# Patient Record
Sex: MALE | Race: WHITE
[De-identification: names, ages, dates, MRNs, and addresses within clinical notes are randomized per-mention and may not be internally consistent; named-entity substitution may affect disease eponyms.]

---

## 2019-08-24 ENCOUNTER — HOSPITAL ENCOUNTER (INPATIENT)
Dept: HOSPITAL 95 - ER | Age: 84
LOS: 5 days | Discharge: HOME HEALTH | DRG: 871 | End: 2019-08-29
Attending: HOSPITALIST | Admitting: HOSPITALIST
Payer: MEDICARE

## 2019-08-24 VITALS — HEIGHT: 67.01 IN | BODY MASS INDEX: 28.41 KG/M2 | WEIGHT: 181 LBS

## 2019-08-24 DIAGNOSIS — A41.9: Primary | ICD-10-CM

## 2019-08-24 DIAGNOSIS — K81.0: ICD-10-CM

## 2019-08-24 DIAGNOSIS — T36.0X5A: ICD-10-CM

## 2019-08-24 DIAGNOSIS — K52.1: ICD-10-CM

## 2019-08-24 DIAGNOSIS — N39.0: ICD-10-CM

## 2019-08-24 DIAGNOSIS — Z66: ICD-10-CM

## 2019-08-24 DIAGNOSIS — F02.81: ICD-10-CM

## 2019-08-24 DIAGNOSIS — H91.90: ICD-10-CM

## 2019-08-24 DIAGNOSIS — Z96.651: ICD-10-CM

## 2019-08-24 DIAGNOSIS — Z74.01: ICD-10-CM
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
## 2019-08-24 DIAGNOSIS — N17.9: ICD-10-CM

## 2019-08-24 DIAGNOSIS — G30.9: ICD-10-CM

## 2019-08-24 DIAGNOSIS — Z87.891: ICD-10-CM

## 2019-08-24 DIAGNOSIS — R65.20: ICD-10-CM

## 2019-08-24 DIAGNOSIS — G92: ICD-10-CM

## 2019-08-24 LAB
ALBUMIN SERPL BCP-MCNC: 2.6 G/DL (ref 3.4–5)
ALBUMIN/GLOB SERPL: 0.4 {RATIO} (ref 0.8–1.8)
ALT SERPL W P-5'-P-CCNC: 205 U/L (ref 12–78)
ANION GAP SERPL CALCULATED.4IONS-SCNC: 10 MMOL/L (ref 6–16)
AST SERPL W P-5'-P-CCNC: 104 U/L (ref 12–37)
BASOPHILS # BLD AUTO: 0.06 K/MM3 (ref 0–0.23)
BASOPHILS NFR BLD AUTO: 0 % (ref 0–2)
BILIRUB SERPL-MCNC: 1.8 MG/DL (ref 0.1–1)
BUN SERPL-MCNC: 36 MG/DL (ref 8–24)
CALCIUM SERPL-MCNC: 9.3 MG/DL (ref 8.5–10.1)
CHLORIDE SERPL-SCNC: 103 MMOL/L (ref 98–108)
CO2 SERPL-SCNC: 24 MMOL/L (ref 21–32)
CREAT SERPL-MCNC: 1.57 MG/DL (ref 0.6–1.2)
DEPRECATED RDW RBC AUTO: 49.8 FL (ref 35.1–46.3)
EOSINOPHIL # BLD AUTO: 0.01 K/MM3 (ref 0–0.68)
EOSINOPHIL NFR BLD AUTO: 0 % (ref 0–6)
ERYTHROCYTE [DISTWIDTH] IN BLOOD BY AUTOMATED COUNT: 16 % (ref 11.7–14.2)
GLOBULIN SER CALC-MCNC: 5.9 G/DL (ref 2.2–4)
GLUCOSE SERPL-MCNC: 185 MG/DL (ref 70–99)
HCT VFR BLD AUTO: 40.4 % (ref 37–53)
HGB BLD-MCNC: 12.9 G/DL (ref 13.5–17.5)
IMM GRANULOCYTES # BLD AUTO: 0.2 K/MM3 (ref 0–0.1)
IMM GRANULOCYTES NFR BLD AUTO: 1 % (ref 0–1)
KETONES UR STRIP-MCNC: (no result) MG/DL
LEUKOCYTE ESTERASE UR QL STRIP: (no result)
LYMPHOCYTES # BLD AUTO: 2.18 K/MM3 (ref 0.84–5.2)
LYMPHOCYTES NFR BLD AUTO: 11 % (ref 21–46)
MAGNESIUM SERPL-MCNC: 2.4 MG/DL (ref 1.6–2.4)
MCHC RBC AUTO-ENTMCNC: 31.9 G/DL (ref 31.5–36.5)
MCV RBC AUTO: 86 FL (ref 80–100)
MONOCYTES # BLD AUTO: 1.04 K/MM3 (ref 0.16–1.47)
MONOCYTES NFR BLD AUTO: 5 % (ref 4–13)
NEUTROPHILS # BLD AUTO: 16.35 K/MM3 (ref 1.96–9.15)
NEUTROPHILS NFR BLD AUTO: 82 % (ref 41–73)
NRBC # BLD AUTO: 0 K/MM3 (ref 0–0.02)
NRBC BLD AUTO-RTO: 0 /100 WBC (ref 0–0.2)
PLATELET # BLD AUTO: 459 K/MM3 (ref 150–400)
POTASSIUM SERPL-SCNC: 4.4 MMOL/L (ref 3.5–5.5)
PROT SERPL-MCNC: 8.5 G/DL (ref 6.4–8.2)
PROT UR STRIP-MCNC: (no result) MG/DL
RBC #/AREA URNS HPF: (no result) /HPF (ref 0–2)
SODIUM SERPL-SCNC: 137 MMOL/L (ref 136–145)
SP GR SPEC: 1.01 (ref 1–1.02)
TROPONIN I SERPL-MCNC: <0.015 NG/ML (ref 0–0.04)
UROBILINOGEN UR STRIP-MCNC: (no result) MG/DL
WBC #/AREA URNS HPF: (no result) /HPF (ref 0–5)

## 2019-08-25 LAB
ALBUMIN SERPL BCP-MCNC: 1.9 G/DL (ref 3.4–5)
ALBUMIN/GLOB SERPL: 0.4 {RATIO} (ref 0.8–1.8)
ALT SERPL W P-5'-P-CCNC: 123 U/L (ref 12–78)
ANION GAP SERPL CALCULATED.4IONS-SCNC: 7 MMOL/L (ref 6–16)
AST SERPL W P-5'-P-CCNC: 54 U/L (ref 12–37)
BASOPHILS # BLD AUTO: 0.03 K/MM3 (ref 0–0.23)
BASOPHILS NFR BLD AUTO: 0 % (ref 0–2)
BILIRUB SERPL-MCNC: 1.1 MG/DL (ref 0.1–1)
BUN SERPL-MCNC: 32 MG/DL (ref 8–24)
CALCIUM SERPL-MCNC: 7.9 MG/DL (ref 8.5–10.1)
CHLORIDE SERPL-SCNC: 112 MMOL/L (ref 98–108)
CO2 SERPL-SCNC: 24 MMOL/L (ref 21–32)
CREAT SERPL-MCNC: 1.26 MG/DL (ref 0.6–1.2)
DEPRECATED RDW RBC AUTO: 49.5 FL (ref 35.1–46.3)
EOSINOPHIL # BLD AUTO: 0.06 K/MM3 (ref 0–0.68)
EOSINOPHIL NFR BLD AUTO: 0 % (ref 0–6)
ERYTHROCYTE [DISTWIDTH] IN BLOOD BY AUTOMATED COUNT: 15.9 % (ref 11.7–14.2)
GLOBULIN SER CALC-MCNC: 4.6 G/DL (ref 2.2–4)
GLUCOSE SERPL-MCNC: 113 MG/DL (ref 70–99)
HCT VFR BLD AUTO: 30.3 % (ref 37–53)
HGB BLD-MCNC: 9.6 G/DL (ref 13.5–17.5)
IMM GRANULOCYTES # BLD AUTO: 0.11 K/MM3 (ref 0–0.1)
IMM GRANULOCYTES NFR BLD AUTO: 1 % (ref 0–1)
LYMPHOCYTES # BLD AUTO: 1.99 K/MM3 (ref 0.84–5.2)
LYMPHOCYTES NFR BLD AUTO: 14 % (ref 21–46)
MCHC RBC AUTO-ENTMCNC: 31.7 G/DL (ref 31.5–36.5)
MCV RBC AUTO: 86 FL (ref 80–100)
MONOCYTES # BLD AUTO: 0.72 K/MM3 (ref 0.16–1.47)
MONOCYTES NFR BLD AUTO: 5 % (ref 4–13)
NEUTROPHILS # BLD AUTO: 10.94 K/MM3 (ref 1.96–9.15)
NEUTROPHILS NFR BLD AUTO: 79 % (ref 41–73)
NRBC # BLD AUTO: 0 K/MM3 (ref 0–0.02)
NRBC BLD AUTO-RTO: 0 /100 WBC (ref 0–0.2)
PLATELET # BLD AUTO: 304 K/MM3 (ref 150–400)
POTASSIUM SERPL-SCNC: 3.8 MMOL/L (ref 3.5–5.5)
PROT SERPL-MCNC: 6.5 G/DL (ref 6.4–8.2)
SODIUM SERPL-SCNC: 143 MMOL/L (ref 136–145)

## 2019-08-25 NOTE — NUR
PATIENT CONTINUES TO BE NONVERBAL. WIFE STATES THAT HE DOES SAY SOME WORDS
SOMETIMES. PATIENT SMILING AND WATCHING STAFF WHEN PROVIDING CARE. TURNING
PATIENT EVERY TWO HOURS AND PROVIDING ROJAS CARE. PATIENT CONTINUES TO OOZES
YELLOW PUSS FROM ROJAS. PATIENT TOLERATING FLUIDS SO FAR, DOES HAVE HISTORY OF
CHF ACCORDING TO WIFE. PATIENT SHOWS NO SIGNS OF PAIN. SLEEPING WELL INBETWEEN
CARE.

## 2019-08-25 NOTE — NUR
SHIFT SUMMARY:
 
PT WITH INCREASING MENTATION T/O SHIFT. PT STARTING TO SAYING SINGLE WORDS
MORE FREQUENTLY AND WORDS ARE CLEARER/INTELLIGLE. PT ABLE TO MAKE SOME NEEDS
KNOWN, HOWEVER, R/T DEMENTIA FAMILY REPORTS HAS DIFFICULTY MAKING ALL CARE
NEEDS KNOWN. PT VERY [place] AND DOES NOT APPEAR TO HEAR MOST OF WHAT STAFF SAYS.
 
LUNGS WITH INCREASING COARSENESS HEARD IN THE BILATERAL BASES. SP02- >90% ON
1L 02 VIA N/C. HR REGULAR, SR-70'S RANGE. PT IS MEDICAL STATUS AND WILL TNX
WHEN A BED IS AVAILABLE. NEW IV PLACED IN THE RT FA, AFTER PT PULLED OUT HIS
FIRST IV. PT ON LEVOQUIN/ZOSYN CONTINUED WHILE URINE CX'S PENDING. PT'S WIFE,
REPORTS THIS IS THE 3RD UTI SINCE HE HAS HAD HIS CHRONIC INDWELLING ROJAS
PLACED.
 
TURNING Q2H TO MAINTAIN SKIN INTEGRITY. [place] ORDERED FOR ERYTHEMATOUS RASH
PRESENT TO THE RT/LT BUTTOCKS. PT HAS BEEN WHEELCHAIR/BEDRIDDEN SINCE 12/18
PER WIFE AND PT'S WIFE REPORTS SHE FEELS STRONGLY ABOUT THE PT COMING BACK
HOME AND REPORTS SHE FEELS LIKE SHE CAN ADEQUATELY CARE FOR HIM AT HOME ON HER
OWN.
 
CHRONIC ROJAS CATHETHER W/ 500ML OUT THIS SHIFT OF CLOUDY, NOHEMY URINE WITH
SEDIMENT. PT CONTINUES TO HAVE PUS DRAINING FROM MEATUS, REQUIRING DILIGENT
CATHETER CARE.  NO BM THIS SHIFT. REMAINS NPO UNTIL SPEECH EVALUATION DONE.

## 2019-08-25 NOTE — NUR
VERIFED WITH DR. CESPEDES THAT PATIENT IS DNR/DNI AND WHETHER HE WANTED FLAGYL
TO BE GIVEN OR NOT. FLAGYL WAS ORDERED IN ER BUT WAS NOT GIVEN. NO FLAGYL TO
BE GIVEN AT THIS TIME.

## 2019-08-25 NOTE — NUR
PATIENT ARRIVED ON GURNEY WITH EYES CLOSED. PATIENT OPENED EYES WITH PHYSICAL
STIMULI. PATIENT WATCHING AND SMILING AT STAFF WHILE WE CLEANED HIM OF
INCONTINENCE. PATIENT NONVERBAL AT THIS TIME. WIFE LYNDSEY AT BEDSIDE WITH
DAUGHTERS. WIFE STAYING THE NIGHT. SHE STATES SHE TAKES CARE OF HIM FULL TIME
BY HERSELF. PATIENT ROJAS OOZING PUSS, CLEANED. HEAD OF PENIS RED AND SWOLLEN.
PATIENT HAS LARGE AREA OF REDNESS AND RASH AREA THAT EXTENDS FROM BUTTOCK TO
LOW BACK, PIC TAKEN IN CHART.

## 2019-08-25 NOTE — NUR
DR RODRIGUEZ AT THE BEDSIDE FOR SURGICAL CONSULT. DISCUSSED PT'S STATUS WITH
WIFE. CONTINUING TO AWAIT ABD ULTRASOUND TO BE COMPLETED. WILL DO MEDICAL
MANAGEMENT AND CONTINUE ABX PER DR RODRIGUEZ FOR NOW.

## 2019-08-25 NOTE — NUR
AM ASSESSMENT:
 
PT IS DROWSY BUT OPENS EYES TO VERBAL STIMULI. PT IS VERY [place] PER WIFE AND
IS NOT WEARING ANY HEARING AIDS. PT MUMBLING SINGLE WORDS THAT ARE MOSTLY
UNINTELLIGLE. PT'S WIFE REPORTS PT HAS BEEN DROWSY AND MOSTLY NON-VERBAL
LATELY. PT IS FULLY DEPENDENTLY WITH Q2H TURNS TO MAINTAIN SKIN INTEGRITY.
 
LUNGS ARE CLEAR BUT DIMINISHED IN THE BILATERAL BASES. SP02 >90% ON 1L 02 VIA
N/C. HR REGULAR, SR-70'S RANGE. NS @ 200ML/HR THROUGH PIV IN THE LT FA.
 
ABD SOFT/ROUND/NO GRIMACE TO PALPATION. BT'S HYPOACTIVE X4 QAUDS. PT WILL
REMAIN NPO UNTIL SPEECH EVALUATION IS DONE.
 
-DNR STATUS

## 2019-08-26 LAB
ALBUMIN SERPL BCP-MCNC: 1.9 G/DL (ref 3.4–5)
ALBUMIN/GLOB SERPL: 0.4 {RATIO} (ref 0.8–1.8)
ALT SERPL W P-5'-P-CCNC: 77 U/L (ref 12–78)
ANION GAP SERPL CALCULATED.4IONS-SCNC: 7 MMOL/L (ref 6–16)
AST SERPL W P-5'-P-CCNC: 26 U/L (ref 12–37)
BILIRUB SERPL-MCNC: 0.9 MG/DL (ref 0.1–1)
BUN SERPL-MCNC: 28 MG/DL (ref 8–24)
CALCIUM SERPL-MCNC: 8.2 MG/DL (ref 8.5–10.1)
CHLORIDE SERPL-SCNC: 115 MMOL/L (ref 98–108)
CO2 SERPL-SCNC: 25 MMOL/L (ref 21–32)
CREAT SERPL-MCNC: 1.21 MG/DL (ref 0.6–1.2)
DEPRECATED RDW RBC AUTO: 51 FL (ref 35.1–46.3)
ERYTHROCYTE [DISTWIDTH] IN BLOOD BY AUTOMATED COUNT: 15.9 % (ref 11.7–14.2)
GLOBULIN SER CALC-MCNC: 4.9 G/DL (ref 2.2–4)
GLUCOSE SERPL-MCNC: 72 MG/DL (ref 70–99)
HCT VFR BLD AUTO: 31 % (ref 37–53)
HGB BLD-MCNC: 9.7 G/DL (ref 13.5–17.5)
MCHC RBC AUTO-ENTMCNC: 31.3 G/DL (ref 31.5–36.5)
MCV RBC AUTO: 87 FL (ref 80–100)
NRBC # BLD AUTO: 0 K/MM3 (ref 0–0.02)
NRBC BLD AUTO-RTO: 0 /100 WBC (ref 0–0.2)
PLATELET # BLD AUTO: 329 K/MM3 (ref 150–400)
POTASSIUM SERPL-SCNC: 3.6 MMOL/L (ref 3.5–5.5)
PROT SERPL-MCNC: 6.8 G/DL (ref 6.4–8.2)
SODIUM SERPL-SCNC: 147 MMOL/L (ref 136–145)

## 2019-08-26 NOTE — NUR
PT REFUSING TO WEAR N/C AFTER LAST REPOSITIONING.  PT ACTUALLY VERBALIZED IN
SENTENCES: "I DON'T WANT THAT" (TO N/C BEING PLACED BACK ONTO NOSE); "NO, I'M
NOT COLD" (TO QUESTION);  AND "WHERE ARE MY TEETH" (PT SHOWN THAT HIS TEETH
WERE IN DENTURE CUP). VSS.  CALL LIGHT WITHIN REACH. PT CURRENTLY SLEEPING.

## 2019-08-26 NOTE — NUR
PT NOTICED FROM [facility] TO BE PULLING AT CATHETER. UPON CHECKING ON PT, PT WITH
ANOTHER BM, THIS ONE EXTRA LARGE-LARGE BROWN PASTY STOOL WTIH BROWN LIQUID
STOOL. PT PARTICIPATED IN HELPING TURN SELF. CALAMINE LOTION APPLIED TO
REDDENED AREA WHICH HAS REDUCED COMPARED TO PICTURES.

## 2019-08-26 NOTE — NUR
SHIFT SUMMARY
 
SINCE ASSUMING CARE OF PT, PT REMAINS PLEASANTLY CONFUSED. PT APPEARS TO BE
COMFORTABLE AT REST AND NO S/S DISTRESS NOTED. REPOSITIONING Q2H WITH PILLOWS.
CHANGING ATTENDS PRN AS PT INCONTINENT OF STOOL. BARRIER CREAM BEING APPLIED
TO COCCYX AND ALBARO AREA. ROJAS PATENT AND DRAINING DARK YELLOW URINE. PT ON RA
AND LUNGS CLEAR T/O BUT DIMINISHED IN THE BASES. PT HAS BEEN PULLING OUT IVS
AND IS CURRENTLY WAITING FOR ICU NURSE TO ATTEMPT ACCESS. PT NEEDS 100%
FEEDING ASSISTANCE AND FOLLOWING DIET GUIDELINES PER SPEECH THERAPY. FAMILY
AT BEDSIDE FOR SUPPORT. CALL LIGHT WITHIN REACH.

## 2019-08-26 NOTE — NUR
ASSUMED CARE FROM YASMINE RN AT THIS TIME. PT APPEARS COMFORTABLE IN BED. NO S/S
DISTRESS NOTED. FAMILY MEMBER NAMED BRIANA AT BEDSIDE AND QUESTIONS R/T TO CARE
AND TREATMENT PLAN ANSWERED. VS TAKEN. PT PLEASANTLY CONFUSED AND VERY HARD OF
HEARING. SIDE RAILS UP X3 AND BED ALARM IN PLACE FOR SAFETY. FREQUENTLY
ROUNDING.

## 2019-08-27 LAB
ANION GAP SERPL CALCULATED.4IONS-SCNC: 6 MMOL/L (ref 6–16)
BUN SERPL-MCNC: 24 MG/DL (ref 8–24)
CALCIUM SERPL-MCNC: 8.1 MG/DL (ref 8.5–10.1)
CHLORIDE SERPL-SCNC: 115 MMOL/L (ref 98–108)
CO2 SERPL-SCNC: 24 MMOL/L (ref 21–32)
CREAT SERPL-MCNC: 1.1 MG/DL (ref 0.6–1.2)
DEPRECATED RDW RBC AUTO: 51.9 FL (ref 35.1–46.3)
ERYTHROCYTE [DISTWIDTH] IN BLOOD BY AUTOMATED COUNT: 15.9 % (ref 11.7–14.2)
GLUCOSE SERPL-MCNC: 112 MG/DL (ref 70–99)
HCT VFR BLD AUTO: 31.2 % (ref 37–53)
HGB BLD-MCNC: 9.7 G/DL (ref 13.5–17.5)
MCHC RBC AUTO-ENTMCNC: 31.1 G/DL (ref 31.5–36.5)
MCV RBC AUTO: 88 FL (ref 80–100)
NRBC # BLD AUTO: 0 K/MM3 (ref 0–0.02)
NRBC BLD AUTO-RTO: 0 /100 WBC (ref 0–0.2)
PLATELET # BLD AUTO: 329 K/MM3 (ref 150–400)
POTASSIUM SERPL-SCNC: 3.2 MMOL/L (ref 3.5–5.5)
SODIUM SERPL-SCNC: 145 MMOL/L (ref 136–145)

## 2019-08-27 NOTE — NUR
PT has advanced dementia, Wife Surekha said PT takes Melatonin 3 mg at HS. Zunilda reinoso and mellatonin rx. [place] precaitions nectar thick liq given,
suction set up. Entire perianal area to low back with red raised rash. EX ranjana
loose brown stool incontience, skin care with karacleanse then antifungal
cream with calmaseptic and barrier cream applied. [place] care completed. Pt
able to communicate but has advanced dementia, thinks hes in [place]. [employer]
, Wife is caregiver, lives in [place].

## 2019-08-27 NOTE — NUR
SHIFT SUMMARY
 
PT IS A TRANSFER FROM PCU THIS EVENING. PT IS ALERT, BUT PLEASANTLY CONFUSED.
PT'S SPOUSE AT BEDSIDE. CALL LIGHT IN REACH AND BED ALARM ON FOR SAFETY. PT
HAS NO COMPLAINTS AT THIS TIME. SITTING COMFORTABLY IN BED, EATING DINNER WITH
ASSIST OF FEEDING. WILL CONTINUE TO MONITOR AND REPORT TO ONCOMING RN.

## 2019-08-27 NOTE — NUR
SHIFT SUMMARY
 
PT ALERT, [place], ORIENTED TO SELF AND WIFE PRESENT AT BEDSIDE UPON CARE
ASSUMPTION AT BEGINNING OF SHIFT. PT MEDICAL NO TELE STATUS. VSS. CHRONIC
ROJAS CATH PATENT AND DRAINING DARK YELLOW URINE. HEAD OF PENIS IF SPLIT FROM
CHRONIC ROJAS CATH. RASH PRESENT ON BILAT BUTTOCKS. Q2H REPOSITIONING BY 2
STAFF MEMBERS. BED ALARM ON. WILL CONTINUE TO MONITOR AND PROVIDE CARE UNTIL
REPORT OFF TO DAY SHIFT RN.

## 2019-08-28 LAB
ALBUMIN SERPL BCP-MCNC: 1.9 G/DL (ref 3.4–5)
ALBUMIN/GLOB SERPL: 0.4 {RATIO} (ref 0.8–1.8)
ALT SERPL W P-5'-P-CCNC: 37 U/L (ref 12–78)
ANION GAP SERPL CALCULATED.4IONS-SCNC: 7 MMOL/L (ref 6–16)
AST SERPL W P-5'-P-CCNC: 14 U/L (ref 12–37)
BASOPHILS # BLD AUTO: 0.07 K/MM3 (ref 0–0.23)
BASOPHILS NFR BLD AUTO: 1 % (ref 0–2)
BILIRUB SERPL-MCNC: 0.4 MG/DL (ref 0.1–1)
BUN SERPL-MCNC: 20 MG/DL (ref 8–24)
CALCIUM SERPL-MCNC: 8 MG/DL (ref 8.5–10.1)
CHLORIDE SERPL-SCNC: 116 MMOL/L (ref 98–108)
CO2 SERPL-SCNC: 25 MMOL/L (ref 21–32)
CREAT SERPL-MCNC: 0.9 MG/DL (ref 0.6–1.2)
DEPRECATED RDW RBC AUTO: 51.9 FL (ref 35.1–46.3)
EOSINOPHIL # BLD AUTO: 0.65 K/MM3 (ref 0–0.68)
EOSINOPHIL NFR BLD AUTO: 6 % (ref 0–6)
ERYTHROCYTE [DISTWIDTH] IN BLOOD BY AUTOMATED COUNT: 16 % (ref 11.7–14.2)
GLOBULIN SER CALC-MCNC: 4.7 G/DL (ref 2.2–4)
GLUCOSE SERPL-MCNC: 116 MG/DL (ref 70–99)
HCT VFR BLD AUTO: 30.1 % (ref 37–53)
HGB BLD-MCNC: 9.5 G/DL (ref 13.5–17.5)
IMM GRANULOCYTES # BLD AUTO: 0.17 K/MM3 (ref 0–0.1)
IMM GRANULOCYTES NFR BLD AUTO: 2 % (ref 0–1)
LYMPHOCYTES # BLD AUTO: 2.43 K/MM3 (ref 0.84–5.2)
LYMPHOCYTES NFR BLD AUTO: 22 % (ref 21–46)
MCHC RBC AUTO-ENTMCNC: 31.6 G/DL (ref 31.5–36.5)
MCV RBC AUTO: 88 FL (ref 80–100)
MONOCYTES # BLD AUTO: 0.59 K/MM3 (ref 0.16–1.47)
MONOCYTES NFR BLD AUTO: 5 % (ref 4–13)
NEUTROPHILS # BLD AUTO: 7.07 K/MM3 (ref 1.96–9.15)
NEUTROPHILS NFR BLD AUTO: 65 % (ref 41–73)
NRBC # BLD AUTO: 0 K/MM3 (ref 0–0.02)
NRBC BLD AUTO-RTO: 0 /100 WBC (ref 0–0.2)
PLATELET # BLD AUTO: 323 K/MM3 (ref 150–400)
POTASSIUM SERPL-SCNC: 3.5 MMOL/L (ref 3.5–5.5)
PROT SERPL-MCNC: 6.6 G/DL (ref 6.4–8.2)
SODIUM SERPL-SCNC: 148 MMOL/L (ref 136–145)

## 2019-08-28 NOTE — NUR
87 year old Male [employer]  disabled and baseline chair bound has chronic
lombardi and fecal incontinence. PT has UTI on antibiotics to treat. 2 exlarge
liquid brown stools this shift. Took less than 300 ml nectar thick liquids via
spoon total feeder. Some nonprod cough after oral intake. Wife supportive
provided updated phone and mailing address. SW referral for need for increased
assistance with cares for this patient.

## 2019-08-28 NOTE — NUR
[facility] inital note:
 
Met with Deyvi and his wife Surekha at bedside.  Deyvi smiles easily and appeared
to be pleased to have company.  He is [place] and has dementia, so conversation
with him was next to impossible.  He did participate in a prayer for healing
and thanked me for cisiting.  Surekha was appreciative of emotional support and
gentle .  She reports a life-long abbe that sustains her.  No concerns
presented.  I will remain available.

## 2019-08-29 LAB
ANION GAP SERPL CALCULATED.4IONS-SCNC: 5 MMOL/L (ref 6–16)
BASOPHILS # BLD AUTO: 0.09 K/MM3 (ref 0–0.23)
BASOPHILS NFR BLD AUTO: 1 % (ref 0–2)
BUN SERPL-MCNC: 16 MG/DL (ref 8–24)
CALCIUM SERPL-MCNC: 7.7 MG/DL (ref 8.5–10.1)
CHLORIDE SERPL-SCNC: 113 MMOL/L (ref 98–108)
CO2 SERPL-SCNC: 28 MMOL/L (ref 21–32)
CREAT SERPL-MCNC: 0.83 MG/DL (ref 0.6–1.2)
DEPRECATED RDW RBC AUTO: 52.1 FL (ref 35.1–46.3)
EOSINOPHIL # BLD AUTO: 0.81 K/MM3 (ref 0–0.68)
EOSINOPHIL NFR BLD AUTO: 7 % (ref 0–6)
ERYTHROCYTE [DISTWIDTH] IN BLOOD BY AUTOMATED COUNT: 15.9 % (ref 11.7–14.2)
GLUCOSE SERPL-MCNC: 116 MG/DL (ref 70–99)
HCT VFR BLD AUTO: 29.4 % (ref 37–53)
HGB BLD-MCNC: 9.1 G/DL (ref 13.5–17.5)
IMM GRANULOCYTES # BLD AUTO: 0.29 K/MM3 (ref 0–0.1)
IMM GRANULOCYTES NFR BLD AUTO: 2 % (ref 0–1)
LYMPHOCYTES # BLD AUTO: 2.39 K/MM3 (ref 0.84–5.2)
LYMPHOCYTES NFR BLD AUTO: 20 % (ref 21–46)
MAGNESIUM SERPL-MCNC: 1.9 MG/DL (ref 1.6–2.4)
MCHC RBC AUTO-ENTMCNC: 31 G/DL (ref 31.5–36.5)
MCV RBC AUTO: 89 FL (ref 80–100)
MONOCYTES # BLD AUTO: 0.57 K/MM3 (ref 0.16–1.47)
MONOCYTES NFR BLD AUTO: 5 % (ref 4–13)
NEUTROPHILS # BLD AUTO: 7.79 K/MM3 (ref 1.96–9.15)
NEUTROPHILS NFR BLD AUTO: 65 % (ref 41–73)
NRBC # BLD AUTO: 0 K/MM3 (ref 0–0.02)
NRBC BLD AUTO-RTO: 0 /100 WBC (ref 0–0.2)
PLATELET # BLD AUTO: 335 K/MM3 (ref 150–400)
POTASSIUM SERPL-SCNC: 3.5 MMOL/L (ref 3.5–5.5)
SODIUM SERPL-SCNC: 146 MMOL/L (ref 136–145)

## 2019-08-29 NOTE — NUR
[place], call light in reach, orintated to self, asphasic, saline locked, cath and
iv kept hidden from him due to tendency to pull on things, bsr shared with
staff and pt

## 2020-01-06 ENCOUNTER — HOSPITAL ENCOUNTER (EMERGENCY)
Dept: HOSPITAL 95 - ER | Age: 85
Discharge: HOME | End: 2020-01-06
Payer: OTHER GOVERNMENT

## 2020-01-06 VITALS — BODY MASS INDEX: 26.66 KG/M2 | HEIGHT: 69 IN | WEIGHT: 180.01 LBS

## 2020-01-06 DIAGNOSIS — Z23: ICD-10-CM

## 2020-01-06 DIAGNOSIS — Z87.891: ICD-10-CM

## 2020-01-06 DIAGNOSIS — Z79.899: ICD-10-CM

## 2020-01-06 DIAGNOSIS — L03.113: Primary | ICD-10-CM

## 2020-01-06 PROCEDURE — G0008 ADMIN INFLUENZA VIRUS VAC: HCPCS

## 2020-06-08 ENCOUNTER — HOSPITAL ENCOUNTER (OUTPATIENT)
Dept: HOSPITAL 95 - LAB SHORT | Age: 85
End: 2020-06-08
Attending: FAMILY MEDICINE
Payer: SELF-PAY

## 2020-06-08 DIAGNOSIS — Z87.440: ICD-10-CM

## 2020-06-08 DIAGNOSIS — Z09: Primary | ICD-10-CM

## 2020-06-08 LAB
LEUKOCYTE ESTERASE UR QL STRIP: (no result)
PROT UR STRIP-MCNC: (no result) MG/DL
RBC #/AREA URNS HPF: (no result) /HPF (ref 0–2)
SP GR SPEC: 1.01 (ref 1–1.02)
UROBILINOGEN UR STRIP-MCNC: (no result) MG/DL
WBC #/AREA URNS HPF: (no result) /HPF (ref 0–5)

## 2020-07-15 ENCOUNTER — HOSPITAL ENCOUNTER (EMERGENCY)
Dept: HOSPITAL 95 - ER | Age: 85
Discharge: HOME | End: 2020-07-15
Payer: OTHER GOVERNMENT

## 2020-07-15 VITALS — WEIGHT: 203.99 LBS | HEIGHT: 67 IN | BODY MASS INDEX: 32.02 KG/M2

## 2020-07-15 DIAGNOSIS — I10: ICD-10-CM

## 2020-07-15 DIAGNOSIS — K21.9: ICD-10-CM

## 2020-07-15 DIAGNOSIS — Z87.891: ICD-10-CM

## 2020-07-15 DIAGNOSIS — F03.90: ICD-10-CM

## 2020-07-15 DIAGNOSIS — N39.0: Primary | ICD-10-CM

## 2020-07-15 DIAGNOSIS — Z79.899: ICD-10-CM

## 2020-07-15 LAB
ALBUMIN SERPL BCP-MCNC: 2.3 G/DL (ref 3.4–5)
ALBUMIN/GLOB SERPL: 0.5 {RATIO} (ref 0.8–1.8)
ALT SERPL W P-5'-P-CCNC: 12 U/L (ref 12–78)
ANION GAP SERPL CALCULATED.4IONS-SCNC: 8 MMOL/L (ref 6–16)
AST SERPL W P-5'-P-CCNC: 14 U/L (ref 12–37)
BASOPHILS # BLD AUTO: 0.07 K/MM3 (ref 0–0.23)
BASOPHILS NFR BLD AUTO: 0 % (ref 0–2)
BILIRUB SERPL-MCNC: 0.7 MG/DL (ref 0.1–1)
BUN SERPL-MCNC: 35 MG/DL (ref 8–24)
CALCIUM SERPL-MCNC: 8.2 MG/DL (ref 8.5–10.1)
CHLORIDE SERPL-SCNC: 103 MMOL/L (ref 98–108)
CO2 SERPL-SCNC: 25 MMOL/L (ref 21–32)
CREAT SERPL-MCNC: 1.48 MG/DL (ref 0.6–1.2)
DEPRECATED RDW RBC AUTO: 48.6 FL (ref 35.1–46.3)
EOSINOPHIL # BLD AUTO: 0.03 K/MM3 (ref 0–0.68)
EOSINOPHIL NFR BLD AUTO: 0 % (ref 0–6)
ERYTHROCYTE [DISTWIDTH] IN BLOOD BY AUTOMATED COUNT: 15.2 % (ref 11.7–14.2)
GLOBULIN SER CALC-MCNC: 4.3 G/DL (ref 2.2–4)
GLUCOSE SERPL-MCNC: 172 MG/DL (ref 70–99)
HCT VFR BLD AUTO: 37.2 % (ref 37–53)
HGB BLD-MCNC: 11.6 G/DL (ref 13.5–17.5)
IMM GRANULOCYTES # BLD AUTO: 0.14 K/MM3 (ref 0–0.1)
IMM GRANULOCYTES NFR BLD AUTO: 1 % (ref 0–1)
LYMPHOCYTES # BLD AUTO: 1.5 K/MM3 (ref 0.84–5.2)
LYMPHOCYTES NFR BLD AUTO: 9 % (ref 21–46)
MCHC RBC AUTO-ENTMCNC: 31.2 G/DL (ref 31.5–36.5)
MCV RBC AUTO: 88 FL (ref 80–100)
MONOCYTES # BLD AUTO: 1.18 K/MM3 (ref 0.16–1.47)
MONOCYTES NFR BLD AUTO: 7 % (ref 4–13)
NEUTROPHILS # BLD AUTO: 14.13 K/MM3 (ref 1.96–9.15)
NEUTROPHILS NFR BLD AUTO: 83 % (ref 41–73)
NRBC # BLD AUTO: 0 K/MM3 (ref 0–0.02)
NRBC BLD AUTO-RTO: 0 /100 WBC (ref 0–0.2)
PLATELET # BLD AUTO: 259 K/MM3 (ref 150–400)
POTASSIUM SERPL-SCNC: 4.5 MMOL/L (ref 3.5–5.5)
PROT SERPL-MCNC: 6.6 G/DL (ref 6.4–8.2)
SODIUM SERPL-SCNC: 136 MMOL/L (ref 136–145)

## 2020-07-20 ENCOUNTER — HOSPITAL ENCOUNTER (EMERGENCY)
Dept: HOSPITAL 95 - ER | Age: 85
Discharge: HOME | End: 2020-07-20
Payer: OTHER GOVERNMENT

## 2020-07-20 VITALS — HEIGHT: 70 IN | WEIGHT: 209.99 LBS | BODY MASS INDEX: 30.06 KG/M2

## 2020-07-20 DIAGNOSIS — K21.9: ICD-10-CM

## 2020-07-20 DIAGNOSIS — Z87.891: ICD-10-CM

## 2020-07-20 DIAGNOSIS — I10: ICD-10-CM

## 2020-07-20 DIAGNOSIS — Z79.899: ICD-10-CM

## 2020-07-20 DIAGNOSIS — Z87.440: ICD-10-CM

## 2020-07-20 DIAGNOSIS — T83.098A: Primary | ICD-10-CM

## 2020-07-20 DIAGNOSIS — N40.0: ICD-10-CM

## 2020-09-09 ENCOUNTER — HOSPITAL ENCOUNTER (EMERGENCY)
Dept: HOSPITAL 95 - ER | Age: 85
Discharge: HOME | End: 2020-09-09
Payer: OTHER GOVERNMENT

## 2020-09-09 VITALS — HEIGHT: 65 IN | BODY MASS INDEX: 35.82 KG/M2 | WEIGHT: 214.99 LBS

## 2020-09-09 DIAGNOSIS — Z96.651: ICD-10-CM

## 2020-09-09 DIAGNOSIS — Z74.01: ICD-10-CM

## 2020-09-09 DIAGNOSIS — Z00.00: Primary | ICD-10-CM

## 2020-09-09 DIAGNOSIS — I10: ICD-10-CM

## 2020-09-09 DIAGNOSIS — Z20.828: ICD-10-CM

## 2020-09-09 DIAGNOSIS — F03.90: ICD-10-CM

## 2020-09-09 PROCEDURE — U0002 COVID-19 LAB TEST NON-CDC: HCPCS
